# Patient Record
Sex: MALE | Race: WHITE | NOT HISPANIC OR LATINO | Employment: FULL TIME | URBAN - METROPOLITAN AREA
[De-identification: names, ages, dates, MRNs, and addresses within clinical notes are randomized per-mention and may not be internally consistent; named-entity substitution may affect disease eponyms.]

---

## 2018-01-29 ENCOUNTER — NON-PROVIDER VISIT (OUTPATIENT)
Dept: OCCUPATIONAL MEDICINE | Facility: CLINIC | Age: 23
End: 2018-01-29

## 2018-01-29 ENCOUNTER — OFFICE VISIT (OUTPATIENT)
Dept: OCCUPATIONAL MEDICINE | Facility: CLINIC | Age: 23
End: 2018-01-29

## 2018-01-29 VITALS
WEIGHT: 166 LBS | SYSTOLIC BLOOD PRESSURE: 142 MMHG | BODY MASS INDEX: 23.24 KG/M2 | HEIGHT: 71 IN | DIASTOLIC BLOOD PRESSURE: 94 MMHG | TEMPERATURE: 99.7 F | OXYGEN SATURATION: 97 % | HEART RATE: 90 BPM | RESPIRATION RATE: 16 BRPM

## 2018-01-29 DIAGNOSIS — Z02.89 ENCOUNTER FOR OCCUPATIONAL HEALTH EXAMINATION: ICD-10-CM

## 2018-01-29 DIAGNOSIS — Z02.1 PRE-EMPLOYMENT DRUG SCREENING: ICD-10-CM

## 2018-01-29 LAB
AMP AMPHETAMINE: NORMAL
BAR BARBITURATES: NORMAL
BZO BENZODIAZEPINES: NORMAL
COC COCAINE: NORMAL
INT CON NEG: NORMAL
INT CON POS: NORMAL
MDMA ECSTASY: NORMAL
MET METHAMPHETAMINES: NORMAL
MTD METHADONE: NORMAL
OPI OPIATES: NORMAL
OXY OXYCODONE: NORMAL
PCP PHENCYCLIDINE: NORMAL
POC URINE DRUG SCREEN OCDRS: NORMAL
THC: NORMAL

## 2018-01-29 PROCEDURE — 8915 PR COMPREHENSIVE PHYSICAL: Performed by: PREVENTIVE MEDICINE

## 2018-01-29 PROCEDURE — 80305 DRUG TEST PRSMV DIR OPT OBS: CPT | Performed by: PREVENTIVE MEDICINE

## 2019-04-29 ENCOUNTER — HOSPITAL ENCOUNTER (EMERGENCY)
Dept: HOSPITAL 8 - ED | Age: 24
Discharge: HOME | End: 2019-04-29
Payer: SELF-PAY

## 2019-04-29 VITALS — BODY MASS INDEX: 26.54 KG/M2 | WEIGHT: 169.09 LBS | HEIGHT: 67 IN

## 2019-04-29 VITALS — DIASTOLIC BLOOD PRESSURE: 78 MMHG | SYSTOLIC BLOOD PRESSURE: 134 MMHG

## 2019-04-29 DIAGNOSIS — F17.210: ICD-10-CM

## 2019-04-29 DIAGNOSIS — N45.1: Primary | ICD-10-CM

## 2019-04-29 LAB
ALBUMIN SERPL-MCNC: 4.5 G/DL (ref 3.4–5)
ANION GAP SERPL CALC-SCNC: 8 MMOL/L (ref 5–15)
BASOPHILS # BLD AUTO: 0.03 X10^3/UL (ref 0–0.1)
BASOPHILS NFR BLD AUTO: 0 % (ref 0–1)
CALCIUM SERPL-MCNC: 9.3 MG/DL (ref 8.5–10.1)
CHLORIDE SERPL-SCNC: 105 MMOL/L (ref 98–107)
CREAT SERPL-MCNC: 0.93 MG/DL (ref 0.7–1.3)
CULTURE INDICATED?: YES
EOSINOPHIL # BLD AUTO: 0.07 X10^3/UL (ref 0–0.4)
EOSINOPHIL NFR BLD AUTO: 0 % (ref 1–7)
ERYTHROCYTE [DISTWIDTH] IN BLOOD BY AUTOMATED COUNT: 12.3 % (ref 9.4–14.8)
LYMPHOCYTES # BLD AUTO: 2.61 X10^3/UL (ref 1–3.4)
LYMPHOCYTES NFR BLD AUTO: 16 % (ref 22–44)
MCH RBC QN AUTO: 33.2 PG (ref 27.5–34.5)
MCHC RBC AUTO-ENTMCNC: 34.9 G/DL (ref 33.2–36.2)
MCV RBC AUTO: 95.3 FL (ref 81–97)
MD: NO
MICROSCOPIC: (no result)
MONOCYTES # BLD AUTO: 0.72 X10^3/UL (ref 0.2–0.8)
MONOCYTES NFR BLD AUTO: 5 % (ref 2–9)
NEUTROPHILS # BLD AUTO: 12.53 X10^3/UL (ref 1.8–6.8)
NEUTROPHILS NFR BLD AUTO: 79 % (ref 42–75)
PLATELET # BLD AUTO: 261 X10^3/UL (ref 130–400)
PMV BLD AUTO: 8.6 FL (ref 7.4–10.4)
RBC # BLD AUTO: 5.05 X10^6/UL (ref 4.38–5.82)

## 2019-04-29 PROCEDURE — 76870 US EXAM SCROTUM: CPT

## 2019-04-29 PROCEDURE — 82040 ASSAY OF SERUM ALBUMIN: CPT

## 2019-04-29 PROCEDURE — 87086 URINE CULTURE/COLONY COUNT: CPT

## 2019-04-29 PROCEDURE — 81001 URINALYSIS AUTO W/SCOPE: CPT

## 2019-04-29 PROCEDURE — 96372 THER/PROPH/DIAG INJ SC/IM: CPT

## 2019-04-29 PROCEDURE — 85025 COMPLETE CBC W/AUTO DIFF WBC: CPT

## 2019-04-29 PROCEDURE — 99284 EMERGENCY DEPT VISIT MOD MDM: CPT

## 2019-04-29 PROCEDURE — 36415 COLL VENOUS BLD VENIPUNCTURE: CPT

## 2019-04-29 PROCEDURE — 93975 VASCULAR STUDY: CPT

## 2019-04-29 PROCEDURE — 80048 BASIC METABOLIC PNL TOTAL CA: CPT

## 2019-04-29 NOTE — NUR
First contact with pt. Pt c/o R testicle pain radiating to R groin starting 
yesterday. Pt placed in gown, positioned for comfort in bed. Continuous oxygen 
and BP monitors applied, all safety measures observed.